# Patient Record
Sex: FEMALE
[De-identification: names, ages, dates, MRNs, and addresses within clinical notes are randomized per-mention and may not be internally consistent; named-entity substitution may affect disease eponyms.]

---

## 2021-05-11 PROBLEM — Z00.00 ENCOUNTER FOR PREVENTIVE HEALTH EXAMINATION: Status: ACTIVE | Noted: 2021-05-11

## 2021-05-14 ENCOUNTER — APPOINTMENT (OUTPATIENT)
Dept: COLORECTAL SURGERY | Facility: CLINIC | Age: 71
End: 2021-05-14
Payer: MEDICARE

## 2021-05-14 VITALS
HEART RATE: 80 BPM | BODY MASS INDEX: 33.48 KG/M2 | HEIGHT: 61 IN | WEIGHT: 177.31 LBS | SYSTOLIC BLOOD PRESSURE: 105 MMHG | OXYGEN SATURATION: 96 % | DIASTOLIC BLOOD PRESSURE: 69 MMHG | TEMPERATURE: 98.1 F

## 2021-05-14 DIAGNOSIS — Z86.39 PERSONAL HISTORY OF OTHER ENDOCRINE, NUTRITIONAL AND METABOLIC DISEASE: ICD-10-CM

## 2021-05-14 DIAGNOSIS — Z86.79 PERSONAL HISTORY OF OTHER DISEASES OF THE CIRCULATORY SYSTEM: ICD-10-CM

## 2021-05-14 DIAGNOSIS — Z87.19 PERSONAL HISTORY OF OTHER DISEASES OF THE DIGESTIVE SYSTEM: ICD-10-CM

## 2021-05-14 DIAGNOSIS — Z98.890 OTHER SPECIFIED POSTPROCEDURAL STATES: ICD-10-CM

## 2021-05-14 DIAGNOSIS — K56.50 INTESTINAL ADHESIONS [BANDS], UNSPECIFIED AS TO PARTIAL VERSUS COMPLETE OBSTRUCTION: ICD-10-CM

## 2021-05-14 DIAGNOSIS — Z78.9 OTHER SPECIFIED HEALTH STATUS: ICD-10-CM

## 2021-05-14 PROCEDURE — 99205 OFFICE O/P NEW HI 60 MIN: CPT

## 2021-05-14 RX ORDER — DIPHENOXYLATE HCL/ATROPINE 2.5-.025/5
2.5-0.025 LIQUID (ML) ORAL
Refills: 0 | Status: ACTIVE | COMMUNITY

## 2021-05-14 RX ORDER — IBANDRONATE SODIUM 150 MG/1
150 TABLET, FILM COATED ORAL
Refills: 0 | Status: ACTIVE | COMMUNITY

## 2021-05-14 RX ORDER — AMLODIPINE BESYLATE 5 MG/1
5 TABLET ORAL
Refills: 0 | Status: ACTIVE | COMMUNITY

## 2021-05-14 RX ORDER — ESCITALOPRAM OXALATE 10 MG/1
10 TABLET, FILM COATED ORAL
Refills: 0 | Status: ACTIVE | COMMUNITY

## 2021-05-14 RX ORDER — MELOXICAM 15 MG/1
TABLET ORAL
Refills: 0 | Status: ACTIVE | COMMUNITY

## 2021-05-14 NOTE — ASSESSMENT
[FreeTextEntry1] : Hx DARRYL, s/p TPC with ileostomy.\par Recent SBO's related to parastomal hernia\par Had Feb hernia repair complicated by abscess and required several subsequent procedures.\par Then laparosocpy 2 weeks ago for ? another SBO (? if hernia recurred vs adhesion related or other cause)\par JUst home 6 days with reasonable bowel function (3-4 emptyings per day) and no N/V or pain.\par Reasonable UO as per patients description. She understands issue of dehydration\par \par TUCKER Chen worked with her regarding her appliance and stomal care.\par \par No further work up or testing needed presently.\par If she develops N/V, distension, limited or no stomal output then will call or go to ER.

## 2021-05-14 NOTE — PHYSICAL EXAM
[Abdomen Masses] : No abdominal masses [Abdomen Tenderness] : ~T No ~M abdominal tenderness [JVD] : no jugular venous distention  [Thyroid] : the thyroid was abnormal [Carotid Bruits] : no carotid bruits [de-identified] : not distended, soft, RLQ transverse incision near stoma noted.  port sites on left side also noted [de-identified] : well healed proctectomy wound [de-identified] : NAD

## 2021-05-14 NOTE — HISTORY OF PRESENT ILLNESS
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 \par \par First hernia was in February 20,   for a stomal prolapse.\par Bowel never settled down.  \par Patient underwent  emergent surgery in January 2021 for an incarcerated parastomal hernia and was found to have a foot of necrotic bowel.  SBR done with repair of hernia. \par IR evacuation of abscess post op x 1-2 postop. \par Since then multiple sbo's and another surgery in May, hernia repair and no mesh.\par Discharged from hospital 6 days ago.  Been eating and with good stomal function since.  \par \par Puts out about 600 ccs aday